# Patient Record
Sex: MALE | Race: WHITE
[De-identification: names, ages, dates, MRNs, and addresses within clinical notes are randomized per-mention and may not be internally consistent; named-entity substitution may affect disease eponyms.]

---

## 2020-08-07 ENCOUNTER — EMPLOYEE WELLNESS (OUTPATIENT)
Dept: OTHER | Facility: CLINIC | Age: 40
End: 2020-08-07

## 2020-08-07 LAB
CHOLEST SERPL-MCNC: 218 MG/DL
GLUCOSE SERPL-MCNC: 96 MG/DL (ref 65–100)
HDLC SERPL-MCNC: 55 MG/DL
LDLC SERPL CALC-MCNC: 137 MG/DL (ref 0–100)
TRIGL SERPL-MCNC: 130 MG/DL (ref ?–150)

## 2020-10-07 ENCOUNTER — PATIENT OUTREACH (OUTPATIENT)
Dept: OTHER | Age: 40
End: 2020-10-07

## 2020-10-07 NOTE — PROGRESS NOTES
Patient was referred to me by Dr. Amelie Pineda, who is reviewing [facility] labs/vitals. Concerns were noted because patient did not have a PCP listed in [facility] and had elevated cholesterol levels. Outreach to patient, verified  and zip code for HIPPA compliance. Told patient about our program.  He states that Dr. Steven Fontenot is his PCP, which I added into [facility] care. He follows up with him regularly and completes lab work through his office. States he is completing a [facility] program on line for HRA. Did not feel he needs any assistance from care management at this time. Appreciative for the call.

## 2021-09-13 LAB
CHOLEST SERPL-MCNC: 218 MG/DL
GLUCOSE SERPL-MCNC: 98 MG/DL (ref 65–100)
HDLC SERPL-MCNC: 51 MG/DL
LDLC SERPL CALC-MCNC: 150.6 MG/DL (ref 0–100)
TRIGL SERPL-MCNC: 82 MG/DL (ref ?–150)

## 2023-08-10 LAB
CHOLEST SERPL-MCNC: 205 MG/DL
CHOLEST SERPL-MCNC: 205 MG/DL
GLUCOSE SERPL-MCNC: 108 MG/DL (ref 74–99)
GLUCOSE SERPL-MCNC: 108 MG/DL (ref 74–99)
HDLC SERPL-MCNC: 46 MG/DL (ref 40–60)
HDLC SERPL-MCNC: 46 MG/DL (ref 40–60)
LDLC SERPL CALC-MCNC: 137.4 MG/DL (ref 0–100)
LDLC SERPL CALC-MCNC: 137.4 MG/DL (ref 0–100)
TRIGL SERPL-MCNC: 108 MG/DL
TRIGL SERPL-MCNC: 108 MG/DL (ref ?–150)

## 2024-09-26 LAB
CHOLEST SERPL-MCNC: 189 MG/DL
GLUCOSE SERPL-MCNC: 99 MG/DL (ref 74–99)
HDLC SERPL-MCNC: 45 MG/DL (ref 40–60)
LDLC SERPL CALC-MCNC: 112 MG/DL (ref 0–100)
TRIGL SERPL-MCNC: 160 MG/DL